# Patient Record
Sex: MALE | Race: OTHER | NOT HISPANIC OR LATINO | URBAN - METROPOLITAN AREA
[De-identification: names, ages, dates, MRNs, and addresses within clinical notes are randomized per-mention and may not be internally consistent; named-entity substitution may affect disease eponyms.]

---

## 2023-02-10 ENCOUNTER — OUTPATIENT (OUTPATIENT)
Dept: OUTPATIENT SERVICES | Facility: HOSPITAL | Age: 23
LOS: 1 days | End: 2023-02-10

## 2023-02-10 VITALS
RESPIRATION RATE: 14 BRPM | TEMPERATURE: 98 F | OXYGEN SATURATION: 99 % | DIASTOLIC BLOOD PRESSURE: 68 MMHG | HEIGHT: 65 IN | HEART RATE: 96 BPM | WEIGHT: 110.01 LBS | SYSTOLIC BLOOD PRESSURE: 136 MMHG

## 2023-02-10 DIAGNOSIS — M26.02 MAXILLARY HYPOPLASIA: ICD-10-CM

## 2023-02-10 DIAGNOSIS — K08.409 PARTIAL LOSS OF TEETH, UNSPECIFIED CAUSE, UNSPECIFIED CLASS: Chronic | ICD-10-CM

## 2023-02-10 LAB
BLD GP AB SCN SERPL QL: NEGATIVE — SIGNIFICANT CHANGE UP
HCT VFR BLD CALC: 35.3 % — LOW (ref 39–50)
HGB BLD-MCNC: 12.3 G/DL — LOW (ref 13–17)
MCHC RBC-ENTMCNC: 32.9 PG — SIGNIFICANT CHANGE UP (ref 27–34)
MCHC RBC-ENTMCNC: 34.8 GM/DL — SIGNIFICANT CHANGE UP (ref 32–36)
MCV RBC AUTO: 94.4 FL — SIGNIFICANT CHANGE UP (ref 80–100)
NRBC # BLD: 0 /100 WBCS — SIGNIFICANT CHANGE UP (ref 0–0)
NRBC # FLD: 0 K/UL — SIGNIFICANT CHANGE UP (ref 0–0)
PLATELET # BLD AUTO: 249 K/UL — SIGNIFICANT CHANGE UP (ref 150–400)
RBC # BLD: 3.74 M/UL — LOW (ref 4.2–5.8)
RBC # FLD: 12.7 % — SIGNIFICANT CHANGE UP (ref 10.3–14.5)
RH IG SCN BLD-IMP: POSITIVE — SIGNIFICANT CHANGE UP
WBC # BLD: 9 K/UL — SIGNIFICANT CHANGE UP (ref 3.8–10.5)
WBC # FLD AUTO: 9 K/UL — SIGNIFICANT CHANGE UP (ref 3.8–10.5)

## 2023-02-10 RX ORDER — SODIUM CHLORIDE 9 MG/ML
1000 INJECTION, SOLUTION INTRAVENOUS
Refills: 0 | Status: DISCONTINUED | OUTPATIENT
Start: 2023-02-23 | End: 2023-02-23

## 2023-02-10 NOTE — H&P PST ADULT - NSICDXPASTMEDICALHX_GEN_ALL_CORE_FT
PAST MEDICAL HISTORY:  Asthma     Maxillary hypoplasia      PAST MEDICAL HISTORY:  Anemia     Asthma     Maxillary hypoplasia

## 2023-02-10 NOTE — H&P PST ADULT - ENMT COMMENTS
full rom of neck, mal underbite, wearing braces, denies dentures and loose teeth full rom of neck, mal 2

## 2023-02-10 NOTE — H&P PST ADULT - NSANTHNECKRD_ENT_A_CORE
No Tien Vieyra)  Internal Medicine  [address] [place] [address], [place] [address]  Phone: [phone]  Fax: [phone]  Follow Up Time:

## 2023-02-10 NOTE — H&P PST ADULT - PROBLEM SELECTOR PLAN 1
Pt scheduled for maxillary lefort 1 osteotomy, bilateral sagittal split osteotomies on 2/23/2023.  labs done results pending, Pt instructed to obtain preop covid testing.  Preop teaching done, pt able to verbalize understanding.  medication day of procedure- albuterol, pepcid

## 2023-02-10 NOTE — H&P PST ADULT - GASTROINTESTINAL
normal/soft/nontender/nondistended/normal active bowel sounds/no guarding/no rigidity/no organomegaly/no palpable rodrick/no masses palpable negative

## 2023-02-22 NOTE — ASU PATIENT PROFILE, ADULT - FALL HARM RISK - UNIVERSAL INTERVENTIONS
Yes, if we have enough, patient can  Xifaxan from the office for a course of 550 mg for 14 days TID for a total of 42 pills  If we do not have enough samples, I can send script to her pharmacy  Bed in lowest position, wheels locked, appropriate side rails in place/Call bell, personal items and telephone in reach/Instruct patient to call for assistance before getting out of bed or chair/Non-slip footwear when patient is out of bed/[place] to call system/Physically safe environment - no spills, clutter or unnecessary equipment/Purposeful Proactive Rounding/Room/bathroom lighting operational, light cord in reach

## 2023-02-23 ENCOUNTER — INPATIENT (INPATIENT)
Facility: HOSPITAL | Age: 23
LOS: 0 days | Discharge: HOME | End: 2023-02-24
Attending: ORAL & MAXILLOFACIAL SURGERY | Admitting: ORAL & MAXILLOFACIAL SURGERY

## 2023-02-23 VITALS
DIASTOLIC BLOOD PRESSURE: 67 MMHG | SYSTOLIC BLOOD PRESSURE: 111 MMHG | HEART RATE: 53 BPM | TEMPERATURE: 98 F | OXYGEN SATURATION: 100 % | RESPIRATION RATE: 16 BRPM

## 2023-02-23 DIAGNOSIS — M26.02 MAXILLARY HYPOPLASIA: ICD-10-CM

## 2023-02-23 DIAGNOSIS — K08.409 PARTIAL LOSS OF TEETH, UNSPECIFIED CAUSE, UNSPECIFIED CLASS: Chronic | ICD-10-CM

## 2023-02-23 DIAGNOSIS — M26.03 MANDIBULAR HYPERPLASIA: ICD-10-CM

## 2023-02-23 LAB — GAS PNL BLDA: SIGNIFICANT CHANGE UP

## 2023-02-23 DEVICE — AVITENE: Type: IMPLANTABLE DEVICE | Status: FUNCTIONAL

## 2023-02-23 DEVICE — PLATE FACIAL ORTHOG ID SZ 6: Type: IMPLANTABLE DEVICE | Status: FUNCTIONAL

## 2023-02-23 DEVICE — SCREW AXS SELF DRILL 1.7X4MM MUST ORDER IN MULTIPLES OF 5: Type: IMPLANTABLE DEVICE | Status: FUNCTIONAL

## 2023-02-23 DEVICE — SCREW AXS SELF TAP CRS 1.9X5MM MUST ORDER IN MULTIPLES OF 5: Type: IMPLANTABLE DEVICE | Status: FUNCTIONAL

## 2023-02-23 DEVICE — SCREW SELF TAP CROSSPIN MP 2X6MM MUST ORDER IN MULT OF 5: Type: IMPLANTABLE DEVICE | Status: FUNCTIONAL

## 2023-02-23 DEVICE — GUIDE VSP ORTHOG TITANIUM: Type: IMPLANTABLE DEVICE | Status: FUNCTIONAL

## 2023-02-23 DEVICE — SCREW AXS SELF TAP 1.7X4MM MUST ORDER IN MULTIPLES OF 5: Type: IMPLANTABLE DEVICE | Status: FUNCTIONAL

## 2023-02-23 DEVICE — IMP VSP MODELING: Type: IMPLANTABLE DEVICE | Status: FUNCTIONAL

## 2023-02-23 DEVICE — SCREW SELF TP CROSSPIN MP 2X4MM MUST ORDER IN MULTIPLES OF 5: Type: IMPLANTABLE DEVICE | Status: FUNCTIONAL

## 2023-02-23 DEVICE — SCREW AXS SELF TAP 1.7X6MM: Type: IMPLANTABLE DEVICE | Status: FUNCTIONAL

## 2023-02-23 DEVICE — FLOSEAL FAST PREP 5ML: Type: IMPLANTABLE DEVICE | Status: FUNCTIONAL

## 2023-02-23 RX ORDER — ALBUTEROL 90 UG/1
2 AEROSOL, METERED ORAL
Qty: 0 | Refills: 0 | DISCHARGE

## 2023-02-23 RX ORDER — OXYCODONE HYDROCHLORIDE 5 MG/1
5 TABLET ORAL
Qty: 0 | Refills: 0 | DISCHARGE
Start: 2023-02-23

## 2023-02-23 RX ORDER — INFLUENZA VIRUS VACCINE 15; 15; 15; 15 UG/.5ML; UG/.5ML; UG/.5ML; UG/.5ML
0.5 SUSPENSION INTRAMUSCULAR ONCE
Refills: 0 | Status: ACTIVE | OUTPATIENT
Start: 2023-02-23 | End: 2024-01-22

## 2023-02-23 RX ORDER — FLUTICASONE PROPIONATE 50 MCG
1 SPRAY, SUSPENSION NASAL
Qty: 0 | Refills: 0 | DISCHARGE
Start: 2023-02-23

## 2023-02-23 RX ORDER — ONDANSETRON 8 MG/1
4 TABLET, FILM COATED ORAL EVERY 8 HOURS
Refills: 0 | Status: ACTIVE | OUTPATIENT
Start: 2023-02-23 | End: 2024-01-22

## 2023-02-23 RX ORDER — OXYCODONE HYDROCHLORIDE 5 MG/1
5 TABLET ORAL EVERY 4 HOURS
Refills: 0 | Status: ACTIVE | OUTPATIENT
Start: 2023-02-23 | End: 2023-03-02

## 2023-02-23 RX ORDER — IBUPROFEN 200 MG
30 TABLET ORAL
Qty: 0 | Refills: 0 | DISCHARGE
Start: 2023-02-23

## 2023-02-23 RX ORDER — SODIUM CHLORIDE 0.65 %
2 AEROSOL, SPRAY (ML) NASAL
Refills: 0 | Status: ACTIVE | OUTPATIENT
Start: 2023-02-23 | End: 2024-01-22

## 2023-02-23 RX ORDER — METOCLOPRAMIDE HCL 10 MG
8 TABLET ORAL ONCE
Refills: 0 | Status: COMPLETED | OUTPATIENT
Start: 2023-02-23 | End: 2023-02-23

## 2023-02-23 RX ORDER — ALBUTEROL 90 UG/1
2.5 AEROSOL, METERED ORAL ONCE
Refills: 0 | Status: COMPLETED | OUTPATIENT
Start: 2023-02-23 | End: 2023-02-23

## 2023-02-23 RX ORDER — OXYMETAZOLINE HYDROCHLORIDE 0.5 MG/ML
2 SPRAY NASAL
Qty: 0 | Refills: 0 | DISCHARGE
Start: 2023-02-23 | End: 2023-02-26

## 2023-02-23 RX ORDER — SODIUM CHLORIDE 9 MG/ML
1000 INJECTION INTRAMUSCULAR; INTRAVENOUS; SUBCUTANEOUS ONCE
Refills: 0 | Status: COMPLETED | OUTPATIENT
Start: 2023-02-23 | End: 2023-02-23

## 2023-02-23 RX ORDER — ACETAMINOPHEN 500 MG
650 TABLET ORAL EVERY 6 HOURS
Refills: 0 | Status: ACTIVE | OUTPATIENT
Start: 2023-02-23 | End: 2024-01-22

## 2023-02-23 RX ORDER — ACETAMINOPHEN 500 MG
20 TABLET ORAL
Qty: 0 | Refills: 0 | DISCHARGE
Start: 2023-02-23

## 2023-02-23 RX ORDER — PENICILLIN V POTASSIUM 250 MG
2 TABLET ORAL EVERY 4 HOURS
Refills: 0 | Status: ACTIVE | OUTPATIENT
Start: 2023-02-23 | End: 2024-01-22

## 2023-02-23 RX ORDER — SODIUM CHLORIDE 0.65 %
2 AEROSOL, SPRAY (ML) NASAL
Qty: 0 | Refills: 0 | DISCHARGE
Start: 2023-02-23

## 2023-02-23 RX ORDER — HYDROMORPHONE HYDROCHLORIDE 2 MG/ML
0.5 INJECTION INTRAMUSCULAR; INTRAVENOUS; SUBCUTANEOUS
Refills: 0 | Status: DISCONTINUED | OUTPATIENT
Start: 2023-02-23 | End: 2023-02-23

## 2023-02-23 RX ORDER — FLUTICASONE PROPIONATE 50 MCG
2 SPRAY, SUSPENSION NASAL
Refills: 0 | Status: ACTIVE | OUTPATIENT
Start: 2023-02-23 | End: 2024-01-22

## 2023-02-23 RX ORDER — CHLORHEXIDINE GLUCONATE 213 G/1000ML
15 SOLUTION TOPICAL
Qty: 0 | Refills: 0 | DISCHARGE
Start: 2023-02-23

## 2023-02-23 RX ORDER — ONDANSETRON 8 MG/1
4 TABLET, FILM COATED ORAL ONCE
Refills: 0 | Status: COMPLETED | OUTPATIENT
Start: 2023-02-23 | End: 2023-02-23

## 2023-02-23 RX ORDER — MORPHINE SULFATE 50 MG/1
2 CAPSULE, EXTENDED RELEASE ORAL ONCE
Refills: 0 | Status: ACTIVE | OUTPATIENT
Start: 2023-02-23 | End: 2023-03-02

## 2023-02-23 RX ORDER — ALBUTEROL 90 UG/1
2 AEROSOL, METERED ORAL EVERY 6 HOURS
Refills: 0 | Status: ACTIVE | OUTPATIENT
Start: 2023-02-23 | End: 2024-01-22

## 2023-02-23 RX ORDER — OXYCODONE HYDROCHLORIDE 5 MG/1
10 TABLET ORAL EVERY 4 HOURS
Refills: 0 | Status: ACTIVE | OUTPATIENT
Start: 2023-02-23 | End: 2023-03-02

## 2023-02-23 RX ORDER — FERROUS SULFATE 325(65) MG
1 TABLET ORAL
Qty: 0 | Refills: 0 | DISCHARGE

## 2023-02-23 RX ORDER — CHLORHEXIDINE GLUCONATE 213 G/1000ML
15 SOLUTION TOPICAL
Refills: 0 | Status: ACTIVE | OUTPATIENT
Start: 2023-02-23 | End: 2024-01-22

## 2023-02-23 RX ORDER — SODIUM CHLORIDE 9 MG/ML
1000 INJECTION, SOLUTION INTRAVENOUS
Refills: 0 | Status: ACTIVE | OUTPATIENT
Start: 2023-02-23 | End: 2024-01-22

## 2023-02-23 RX ORDER — OXYMETAZOLINE HYDROCHLORIDE 0.5 MG/ML
2 SPRAY NASAL
Refills: 0 | Status: ACTIVE | OUTPATIENT
Start: 2023-02-23 | End: 2023-02-26

## 2023-02-23 RX ORDER — IBUPROFEN 200 MG
600 TABLET ORAL EVERY 6 HOURS
Refills: 0 | Status: ACTIVE | OUTPATIENT
Start: 2023-02-23 | End: 2024-01-22

## 2023-02-23 RX ADMIN — ALBUTEROL 2.5 MILLIGRAM(S): 90 AEROSOL, METERED ORAL at 15:46

## 2023-02-23 RX ADMIN — Medication 600 MILLIGRAM(S): at 19:04

## 2023-02-23 RX ADMIN — SODIUM CHLORIDE 1000 MILLILITER(S): 9 INJECTION INTRAMUSCULAR; INTRAVENOUS; SUBCUTANEOUS at 15:45

## 2023-02-23 RX ADMIN — Medication 600 MILLIGRAM(S): at 23:43

## 2023-02-23 RX ADMIN — ONDANSETRON 4 MILLIGRAM(S): 8 TABLET, FILM COATED ORAL at 16:26

## 2023-02-23 RX ADMIN — OXYMETAZOLINE HYDROCHLORIDE 2 SPRAY(S): 0.5 SPRAY NASAL at 19:09

## 2023-02-23 RX ADMIN — Medication 2 SPRAY(S): at 19:09

## 2023-02-23 RX ADMIN — SODIUM CHLORIDE 80 MILLILITER(S): 9 INJECTION, SOLUTION INTRAVENOUS at 16:26

## 2023-02-23 RX ADMIN — HYDROMORPHONE HYDROCHLORIDE 0.5 MILLIGRAM(S): 2 INJECTION INTRAMUSCULAR; INTRAVENOUS; SUBCUTANEOUS at 15:55

## 2023-02-23 RX ADMIN — HYDROMORPHONE HYDROCHLORIDE 0.5 MILLIGRAM(S): 2 INJECTION INTRAMUSCULAR; INTRAVENOUS; SUBCUTANEOUS at 16:15

## 2023-02-23 RX ADMIN — Medication 600 MILLIGRAM(S): at 18:34

## 2023-02-23 RX ADMIN — Medication 100 MILLION UNIT(S): at 19:09

## 2023-02-23 RX ADMIN — CHLORHEXIDINE GLUCONATE 15 MILLILITER(S): 213 SOLUTION TOPICAL at 18:34

## 2023-02-23 RX ADMIN — Medication 100 MILLION UNIT(S): at 23:42

## 2023-02-23 RX ADMIN — Medication 8 MILLIGRAM(S): at 18:33

## 2023-02-23 NOTE — H&P ADULT - NSHPPHYSICALEXAM_GEN_ALL_CORE
Patient identifiers verified and correct for Mr Mckeon  C/C: Cough, SOB SEE NN  APPEARANCE: awake and alert in NAD.  SKIN: warm, dry and intact. No breakdown or bruising.  MUSCULOSKELETAL: Patient moving all extremities spontaneously,reports swelling in BLE. Ambulates independently.  RESPIRATORY: Positive  shortness of breath.Respirations unlabored. Positive cough with white sputum, Denies fevers< breath Sounds coarse rightt, decr left  CARDIAC: Denies CP, 2+ distal pulses; no peripheral edema  ABDOMEN: S/ND/NT, Denies nausea  : voids spontaneously, denies difficulty  Neurologic: AAO x 4; follows commands equal strength in all extremities; denies numbness/tingling. Denies dizziness Denies weakness     · Constitutional	well-groomed; no distress  · Eyes	PERRL; EOMI; conjunctiva clear  · ENMT	mouth; neck  · Mouth	normal mouth and gums; moist; underbite  · Neck	supple  · ENMT Comments	full rom of neck, mal 2  · Respiratory	clear to auscultation bilaterally; no wheezes; no rales; no rhonchi; no respiratory distress; no use of accessory muscles; no subcutaneous emphysema; airway patent; breath sounds equal; good air movement; respirations non-labored; no intercostal retractions  · Cardiovascular	regular rate and rhythm; S1 S2 present; no gallops; no rub; no murmur; no JVD; no pedal edema  · Gastrointestinal	soft; nontender; nondistended; normal active bowel sounds; no guarding; no rigidity; no organomegaly; no palpable rodrick; no masses palpable  · Genitourinary Comments	declined  · Neurological	cranial nerves II-XII intact; sensation intact  · Skin	warm and dry; color normal; no rashes; no ulcers  · Lymphatic	No lymphadedenopathy  · Musculoskeletal	normal; normal gait; ROM intact; strength 5/5 bilateral upper extremities; strength 5/5 bilateral lower extremities  · Psychiatric	normal affect; alert and oriented x3; normal behavior

## 2023-02-23 NOTE — ASU PREOP CHECKLIST - PATIENT'S PERSONAL PROPERTY GIVEN TO
PATIENT AUNT (ELLIOT) AT BEDSIDE. PER AUNT PATIENT WAS SITTING ON COUCH WHEN 
HE TURNED HEAD TO THE RIGHT AND STAYED STILL AND STARTED SHAKING, EPISODE 
LASTING APPROX 15 MINUTES. PER AUNT PATIENT HAS NOT TAKEN SEIZURE MEDICATION 
SINCE MARCH, PATIENT WOULD TAKE CARBAMAZEPINE. PER AUNT PATIENT DID NOT FALL OR 
HAVE ANY INJURY. AUNT CLARIFIED PATIENT DEMOGRAPHIC DATA. 





PMH:SEIZURES (PER AUNT)

NKA (PER AUNT) PACU # 10

## 2023-02-23 NOTE — H&P ADULT - NSHPREVIEWOFSYSTEMS_GEN_ALL_CORE
· Negative General Symptoms	no fever; no chills; no sweating; no anorexia; no weight loss; no weight gain; no polyphagia; no polyuria; no polydipsia; no malaise; no fatigue  · Skin/Breast	negative  · Ophthalmologic	negative  · Negative ENMT Symptoms	no hearing difficulty; no ear pain; no tinnitus; no vertigo; no sinus symptoms; no nasal congestion; no nasal discharge; no nasal obstruction; no post-nasal discharge; no nose bleeds; no recurrent cold sores; no abnormal taste sensation; no gum bleeding; no dry mouth; no throat pain; no dysphagia  · ENMT Comments	underbite, wearing braces, denies dentures and loose teeth  · Negative Respiratory and Thorax Symptoms	no wheezing; no dyspnea; no cough; no hemoptysis; no pleuritic chest pain  · Respiratory and Thorax Comments	last episode of wheezing 1 yr ago  · Negative Cardiovascular Symptoms	no chest pain; no palpitations; no dyspnea on exertion; no orthopnea; no paroxysmal nocturnal dyspnea; no peripheral edema; no claudication  · Cardiovascular Comments	weight lifting daily, able to climb 4 flights of stairs  · Gastrointestinal	negative  · Genitourinary	negative  · Musculoskeletal	negative  · Neurological	negative  · Psychiatric	negative  · Hematology/Lymphatics	negative  · Endocrine	negative  · Allergic/Immunologic	negative

## 2023-02-23 NOTE — DISCHARGE NOTE PROVIDER - NSDCMRMEDTOKEN_GEN_ALL_CORE_FT
acetaminophen 650 mg/20.3 mL oral suspension: 20 milliliter(s) orally every 6 hours  albuterol 90 mcg/inh inhalation aerosol: 2 puff(s) inhaled every 6 hours, As Needed  Augmentin 400 mg-57 mg/5 mL oral liquid: 10 milliliter(s) orally every 12 hours  chlorhexidine 0.12% mucous membrane liquid: 15 milliliter(s) mucous membrane 2 times a day  fluticasone 50 mcg/inh nasal spray: 1 spray(s) in each nostril once a day  ibuprofen 100 mg/5 mL oral suspension: 30 milliliter(s) orally every 6 hours  iron: 1 tab(s) orally once a day  oxyCODONE 5 mg/5 mL oral solution: 5 milliliter(s) orally every 6 hours, As Needed for severe pain  oxymetazoline 0.05% nasal spray: 2 spray(s) in each nostril 2 times a day. STOP ON SUNDAY 2/26  sodium chloride 0.65% nasal solution: 2 spray(s) in each nostril every 1 to 2 hours, As Needed

## 2023-02-23 NOTE — DISCHARGE NOTE PROVIDER - HOSPITAL COURSE
2/23/23 HD1  23 yr old male well known to Dr. Benitez with a PMH of a dentofacial deformity presenting to [facility] for a LeFort 1 osteotomy and bilateral sagittal split osteotomies with Dr. Benitez in the OR today.    2/23/23  procedure went well without complication. patient had uneventful post operative course.

## 2023-02-23 NOTE — DISCHARGE NOTE PROVIDER - CARE PROVIDER_API CALL
George Benitez (DDS)  OralMaxillofacial Surgery  [address]  [place], [place] [id number]  Phone: [phone]  Fax: [phone]  Established Patient  Follow Up Time: 1 week

## 2023-02-23 NOTE — H&P ADULT - ASSESSMENT
22y/o male here for maxillary lefort 1 osteotomy, bilateral sagittal split osteotomies with Dr. Benitez

## 2023-02-23 NOTE — DISCHARGE NOTE PROVIDER - NSDCCPTREATMENT_GEN_ALL_CORE_FT
PRINCIPAL PROCEDURE  Procedure: Osteotomy, maxilla, with bilateral sagittal split mandibular osteotomy  Findings and Treatment:

## 2023-02-23 NOTE — H&P ADULT - HISTORY OF PRESENT ILLNESS
24y/o male here for maxillary lefort 1 osteotomy, bilateral sagittal split osteotomies with Dr. Benitez

## 2023-02-23 NOTE — PATIENT PROFILE ADULT - FALL HARM RISK - UNIVERSAL INTERVENTIONS
Bed in lowest position, wheels locked, appropriate side rails in place/Call bell, personal items and telephone in reach/Instruct patient to call for assistance before getting out of bed or chair/Non-slip footwear when patient is out of bed/[place] to call system/Physically safe environment - no spills, clutter or unnecessary equipment/Purposeful Proactive Rounding/Room/bathroom lighting operational, light cord in reach

## 2023-02-24 VITALS
DIASTOLIC BLOOD PRESSURE: 59 MMHG | SYSTOLIC BLOOD PRESSURE: 110 MMHG | RESPIRATION RATE: 17 BRPM | HEART RATE: 69 BPM | OXYGEN SATURATION: 100 % | TEMPERATURE: 98 F

## 2023-02-24 RX ADMIN — Medication 600 MILLIGRAM(S): at 05:04

## 2023-02-24 RX ADMIN — Medication 100 MILLION UNIT(S): at 06:58

## 2023-02-24 RX ADMIN — CHLORHEXIDINE GLUCONATE 15 MILLILITER(S): 213 SOLUTION TOPICAL at 05:03

## 2023-02-24 RX ADMIN — Medication 100 MILLION UNIT(S): at 10:52

## 2023-02-24 RX ADMIN — Medication 2 SPRAY(S): at 00:19

## 2023-02-24 RX ADMIN — OXYMETAZOLINE HYDROCHLORIDE 2 SPRAY(S): 0.5 SPRAY NASAL at 05:07

## 2023-02-24 RX ADMIN — Medication 100 MILLION UNIT(S): at 14:22

## 2023-02-24 RX ADMIN — Medication 600 MILLIGRAM(S): at 13:27

## 2023-02-24 RX ADMIN — Medication 650 MILLIGRAM(S): at 03:09

## 2023-02-24 RX ADMIN — Medication 600 MILLIGRAM(S): at 12:53

## 2023-02-24 RX ADMIN — Medication 650 MILLIGRAM(S): at 09:34

## 2023-02-24 RX ADMIN — Medication 2 SPRAY(S): at 18:17

## 2023-02-24 RX ADMIN — Medication 2 SPRAY(S): at 07:51

## 2023-02-24 RX ADMIN — Medication 650 MILLIGRAM(S): at 10:04

## 2023-02-24 RX ADMIN — Medication 100 MILLION UNIT(S): at 03:11

## 2023-02-24 NOTE — PROGRESS NOTE ADULT - SUBJECTIVE AND OBJECTIVE BOX
[id number] 2/24/23: Patient seen and evaluated at bedside this morning. Resting comfortably in bed. In NAD AVSS. NAEON. Tolerating PO intake, voiding without issue. Has not yet ambulated. Pain well controlled. Denies fevers, chills, nausea, vomiting, dysphagia, dyspnea.     HPI: Patient is s/p Lefort 1, BSSO yesterday.     PE:  H:      Maxillofacial: NC/AT, bilateral facial swelling consistent with normal post-operative course, no trismus,      Intraoral: Incision sites hemostatic, sutures dry, clean, intact, no dehiscence, maxilla pink and perfused, midlines on, elastics in place  E: EOMI, PERRLA  E: No otorrhea, no discharge  N: Nares clear b/l, minimal amount of dried heme,   T: Trachea midline    ICU Vital Signs Last 24 Hrs  T(C): 36.6 (24 Feb 2023 05:02), Max: 36.7 (23 Feb 2023 09:14)  T(F): 97.9 (24 Feb 2023 05:02), Max: 98.1 (23 Feb 2023 09:14)  HR: 87 (24 Feb 2023 05:02) (53 - 91)  BP: 99/53 (24 Feb 2023 05:02) (97/58 - 121/67)  BP(mean): 75 (23 Feb 2023 17:15) (65 - 78)  ABP: 125/57 (23 Feb 2023 16:15) (110/49 - 135/63)  ABP(mean): 78 (23 Feb 2023 16:15) (0 - 86)  RR: 18 (24 Feb 2023 05:02) (11 - 24)  SpO2: 96% (24 Feb 2023 05:02) (96% - 100%)    O2 Parameters below as of 24 Feb 2023 05:02  Patient On (Oxygen Delivery Method): room air    I&O's Detail    23 Feb 2023 07:01  -  24 Feb 2023 07:00  --------------------------------------------------------  IN:    Lactated Ringers: 1200 mL    Oral Fluid: 300 mL  Total IN: 1500 mL    OUT:    Indwelling Catheter - Urethral (mL): 700 mL    Nasogastric/Oral tube (mL): 50 mL    Voided (mL): 1475 mL  Total OUT: 2225 mL    Total NET: -725 mL

## 2023-02-24 NOTE — DIETITIAN INITIAL EVALUATION ADULT - PERTINENT MEDS FT
MEDICATIONS  (STANDING):  acetaminophen    Suspension .. 650 milliGRAM(s) Oral every 6 hours  chlorhexidine 0.12% Liquid 15 milliLiter(s) Oral Mucosa two times a day  fluticasone propionate 50 MICROgram(s)/spray Nasal Spray 2 Spray(s) Both Nostrils <User Schedule>  ibuprofen  Suspension. 600 milliGRAM(s) Oral every 6 hours  influenza   Vaccine 0.5 milliLiter(s) IntraMuscular once  lactated ringers. 1000 milliLiter(s) (80 mL/Hr) IV Continuous <Continuous>  oxymetazoline 0.05% Nasal Spray 2 Spray(s) Both Nostrils two times a day  penicillin   G  potassium  IVPB 2 Million Unit(s) IV Intermittent every 4 hours    MEDICATIONS  (PRN):  albuterol    90 MICROgram(s) HFA Inhaler 2 Puff(s) Inhalation every 6 hours PRN Shortness of Breath and/or Wheezing  morphine  - Injectable 2 milliGRAM(s) IV Push once PRN for breakthrough pain only  ondansetron Injectable 4 milliGRAM(s) IV Push every 8 hours PRN Nausea and/or Vomiting  oxyCODONE    Solution 5 milliGRAM(s) Oral every 4 hours PRN Moderate Pain (4 - 6)  oxyCODONE    Solution 10 milliGRAM(s) Oral every 4 hours PRN Severe Pain (7 - 10)  sodium chloride 0.65% Nasal 2 Spray(s) Both Nostrils every 2 hours PRN Nasal Congestion

## 2023-02-24 NOTE — DIETITIAN INITIAL EVALUATION ADULT - ADD RECOMMEND
1) Diet advancement per medical team   2) Monitor weights, labs, BM's, skin integrity, p.o. intake.   3) Encourage PO intake and honor food preferences as able.   4) No Carb Prosource Qty per day: 2; (provides 30gm protein)

## 2023-02-24 NOTE — DIETITIAN INITIAL EVALUATION ADULT - OTHER INFO
Medical course: Per chart 23 year old male here for maxillary lefort 1 osteotomy, bilateral sagittal split osteotomies with Dr. Benitez    Nutrition interview: Pt A&Ox4, swelling around face and jaw observed. Pt receiving clear liquids diet POD#1. No recent episodes of nausea, vomiting, diarrhea or constipation. No BM since admission, no on bowel regimen (however only receiving liquids). Pt's jaw is currently wired shut s/p surgery, diet advancement deferred to surgical team. No known food allergies. Stated UBW: 115-110# (same as current weight). Intake is 50-75% per RN flowsheets and per pt. Has been able to drink clear liquids through the side of his mouth. Feeding skills: independent. No nutrition related labs available.

## 2023-02-24 NOTE — DIETITIAN INITIAL EVALUATION ADULT - NSFNSGIIOFT_GEN_A_CORE
02-23-23 @ 07:01  -  02-24-23 @ 07:00  --------------------------------------------------------  OUT:    Nasogastric/Oral tube (mL): 50 mL  Total OUT: 50 mL    Total NET: -50 mL

## 2023-02-24 NOTE — DIETITIAN INITIAL EVALUATION ADULT - LITERATURE/VIDEOS GIVEN
Educated pt on how to meet estimated needs when advanced to full liquid diet. Discussed how to increase calories and protein when consuming liquid diet.

## 2023-02-24 NOTE — DIETITIAN INITIAL EVALUATION ADULT - ORAL INTAKE PTA/DIET HISTORY
Pt lives at home with mother. PTA pt was eating regular solid foods. Usually eat ~2 meals/day. Iron and multivitamin supplements taken PTA.

## 2023-02-24 NOTE — PROGRESS NOTE ADULT - ASSESSMENT
23M POD1 s/p le fort 1 and BSSO, progressing consistently with normal post-operative course    Plan:  - Possible d/c today  - encourage ambulation  - encourage PO intake      Ena Nolen  Oral and maxillofacial surgery  Pager: [phone]

## 2023-06-09 NOTE — PATIENT PROFILE ADULT - NSPROPTRIGHTCAREGIVER_GEN_A_NUR
----- Message from Lizandro Walsh PA-C sent at 6/9/2023  9:09 AM CDT -----  All blood counts and iron studies/levels back within normal limits.    Recommend patient continue on her multivitamin as well as an iron supplement (along with vitamin-C to increase absorption) to keep her levels up.    --nic.   yes

## 2023-11-22 NOTE — H&P PST ADULT - HISTORY OF PRESENT ILLNESS
normal mood with appropriate affect 22y/o male scheduled for maxillary lefort 1 osteotomy, bilateral sagittal split osteotomies on 2/23/2023.  Pt states, "hx of underbite, wearing braces."

## 2024-08-23 NOTE — DISCHARGE NOTE NURSING/CASE MANAGEMENT/SOCIAL WORK - PATIENT PORTAL LINK FT
You can access the FollowMyHealth Patient Portal offered by [facility] by registering at the following website: [email or web address]. By joining [employer]’s FollowMyHealth portal, you will also be able to view your health information using other applications (apps) compatible with our system.
18

## (undated) DEVICE — SYR LUER LOK 3CC

## (undated) DEVICE — BUR LINVATEC OVAL MEDIUM 4MM CARBIDE

## (undated) DEVICE — SUT VICRYL 2-0 27" SH UNDYED

## (undated) DEVICE — BUR LINVATEC CARBIDE SIDECUTTING 0.8MM 4.9MM

## (undated) DEVICE — DRAPE 3/4 SHEET 52X76"

## (undated) DEVICE — STRYKER BONE MODEL CLEARVIEW MANDIBLE AND MAXILLA

## (undated) DEVICE — NDL HYPO REGULAR BEVEL 25G X 1.5" (BLUE)

## (undated) DEVICE — SUT CHROMIC 3-0 27" RB-1

## (undated) DEVICE — DRILL BIT STRYKER CRANIOMAXILLOFACIAL 1.4X8MM

## (undated) DEVICE — DRAPE MAYO STAND 23"

## (undated) DEVICE — STAPLER SKIN VISI-STAT 35 WIDE

## (undated) DEVICE — SAW BLADE STRYKER RECIPROCATING 22.5MMX0.38MM

## (undated) DEVICE — DRAPE INSTRUMENT POUCH 6.75" X 11"

## (undated) DEVICE — POSITIONER FOAM EGG CRATE ULNAR 2PCS (PINK)

## (undated) DEVICE — ELCTR GROUNDING PAD ADULT COVIDIEN

## (undated) DEVICE — SUT VICRYL 4-0 27" RB-1 UNDYED

## (undated) DEVICE — VENODYNE/SCD SLEEVE CALF MEDIUM

## (undated) DEVICE — DRAPE TOWEL BLUE 17" X 24"

## (undated) DEVICE — Device

## (undated) DEVICE — PACK DENTAL

## (undated) DEVICE — BATTERY/QD

## (undated) DEVICE — LABELS BLANK W PEN

## (undated) DEVICE — PREP BETADINE KIT

## (undated) DEVICE — FOLEY TRAY 14FR 5CC LF UMETER CLOSED

## (undated) DEVICE — SYR LUER LOK 10CC

## (undated) DEVICE — WARMING BLANKET LOWER ADULT

## (undated) DEVICE — DRILL BIT STRYKER CRANIOMAXILLOFACIAL 1.5X8MM

## (undated) DEVICE — BRA JAW

## (undated) DEVICE — DRILL BIT STRYKER CRANIOMAXILLOFACIAL TWIST 1.5X19X8MM